# Patient Record
Sex: MALE | Race: BLACK OR AFRICAN AMERICAN
[De-identification: names, ages, dates, MRNs, and addresses within clinical notes are randomized per-mention and may not be internally consistent; named-entity substitution may affect disease eponyms.]

---

## 2018-09-22 ENCOUNTER — HOSPITAL ENCOUNTER (EMERGENCY)
Dept: HOSPITAL 56 - MW.ED | Age: 33
Discharge: HOME | End: 2018-09-22
Payer: COMMERCIAL

## 2018-09-22 DIAGNOSIS — L20.9: Primary | ICD-10-CM

## 2018-09-22 DIAGNOSIS — L23.9: ICD-10-CM

## 2018-09-22 NOTE — EDM.PDOC
ED HPI GENERAL MEDICAL PROBLEM





- General


Chief Complaint: Skin Complaint


Stated Complaint: RASH ON NECK


Time Seen by Provider: 09/22/18 13:15


Source of Information: Reports: Patient


History Limitations: Reports: No Limitations





- History of Present Illness


INITIAL COMMENTS - FREE TEXT/NARRATIVE: 


HISTORY AND PHYSICAL:





History of present illness:


Patient is a 33-year-old male who presents to the emergency room with 

complaints of a rash to the back of his neck 4 days. He states that the rash 

severity fluctuates depending on temperature. He states when he gets really hot 

or warm water, the rash does become itchy. He denies any ill contacts to new 

materials, products or medications. This rash is localized to the posterior 

aspect of his neck. Does not appear anywhere else on his body.





He denies any fever, chills, chest pain, shortness of breath or cough. He 

denies any abdominal pain or nausea, vomiting, diarrhea or constipation.





Review of systems: 


As per history of present illness and below otherwise all systems reviewed and 

negative.





Past medical history: 


As per history of present illness and as reviewed below otherwise 

noncontributory.





Surgical history: 


As per history of present illness and as reviewed below otherwise 

noncontributory.





Social history: 


No reported history of drug or alcohol abuse.





Family history: 


As per history of present illness and as reviewed below otherwise 

noncontributory.





Physical exam:


General: Well-developed and well-nourished 33-year-old -American male. 

Alert and oriented. Nontoxic appearing and in no acute distress.


HEENT: Atraumatic, normocephalic, pupils equal and reactive bilaterally, 

negative for conjunctival pallor or scleral icterus, mucous membranes moist, 

throat clear, neck supple, nontender, trachea midline. No drooling or trismus 

noted. No meningeal signs


Lungs: Clear to auscultation, breath sounds equal bilaterally, chest nontender.


Heart: S1S2, regular rate and rhythm without overt murmur


Abdomen: Soft, nondistended, nontender. Negative for masses or 

hepatosplenomegaly. Negative for costovertebral tenderness.


Pelvis: Stable nontender.


Genitourinary: Deferred.


Rectal: Deferred.


Skin: There does appear to be a slightly raised area nonspecific rash to the 

back and neck. No erythema. This is localized to the posterior neck (exposed 

area above the t-shirt and below his hairline). NO cerntal umbilicus or 

vesicular component to this rash is noted. Otherwise skin is intact, warm, dry. 

No lesions noted.


Extremities: Atraumatic, negative for cords or calf pain. Neurovascular 

unremarkable.


Neuro: Awake, alert, oriented. Cranial nerves II through XII unremarkable. 

Cerebellum unremarkable. Motor and sensory unremarkable throughout. Exam 

nonfocal.





Diagnostics:


None





Therapeutics:


None





Prescription:


Medrol DosePak





Impression: 


Atopic Dermatitis





Plan:


1. Take oral steroid as directed.


2. Take over-the-counter Benadryl and cortisone cream as directed


3. Avoid hot showers this is caused increased itching. After showering please 

apply a thick emoliant cream like Eucerine


4. Follow up with primary care provider. Return to the ED as needed and as 

discussed.





Definitive disposition and diagnosis as appropriate pending reevaluation and 

review of above.








- Related Data


 Allergies











Allergy/AdvReac Type Severity Reaction Status Date / Time


 


No Known Allergies Allergy   Verified 09/22/18 12:51











Home Meds: 


 Home Meds





. [No Known Home Meds]  10/31/14 [History]











Past Medical History





- Past Health History


Medical/Surgical History: Denies Medical/Surgical History





Social & Family History





- Family History


Family Medical History: Noncontributory





- Tobacco Use


Smoking Status *Q: Never Smoker





- Caffeine Use


Caffeine Use: Reports: None





- Recreational Drug Use


Recreational Drug Use: No





ED ROS GENERAL





- Review of Systems


Review Of Systems: ROS reveals no pertinent complaints other than HPI.





ED EXAM, SKIN/RASH


Exam: See Below (See dictation)





Course





- Vital Signs


Last Recorded V/S: 


 Last Vital Signs











Temp  98.7 F   09/22/18 12:49


 


Pulse  61   09/22/18 12:49


 


Resp  16   09/22/18 12:49


 


BP  117/78   09/22/18 12:49


 


Pulse Ox  97   09/22/18 12:49














Departure





- Departure


Time of Disposition: 13:26


Disposition: Home, Self-Care 01


Clinical Impression: 


 Atopic dermatitis, mild








- Discharge Information


Instructions:  Atopic Dermatitis


Referrals: 


PCP,None [Primary Care Provider] - 


Forms:  ED Department Discharge


Additional Instructions: 


The following information is given to patients seen in the emergency department 

who are being discharged to home. This information is to outline your options 

for follow-up care. We provide all patients seen in our emergency department 

with a follow-up referral.





The need for follow-up, as well as the timing and circumstances, are variable 

depending upon the specifics of your emergency department visit.





If you don't have a primary care physician on staff, we will provide you with a 

referral. We always advise you to contact your personal physician following an 

emergency department visit to inform them of the circumstance of the visit and 

for follow-up with them and/or the need for any referrals to a consulting 

specialist.





The emergency department will also refer you to a specialist when appropriate. 

This referral assures that you have the opportunity for follow-up care with a 

specialist. All of these measure are taken in an effort to provide you with 

optimal care, which includes your follow-up.





Under all circumstances we always encourage you to contact your private 

physician who remains a resource for coordinating your care. When calling for 

follow-up care, please make the office aware that this follow-up is from your 

recent emergency room visit. If for any reason you are refused follow-up, 

please contact the [facility] Emergency 

Department at [phone] and asked to speak to the emergency department 

charge nurse.





[facility]


Primary Care


[address] [place] [id number]


Phone: [phone]


Fax: [phone]





1. Take oral steroid as directed.


2. Take over-the-counter Benadryl and cortisone cream as directed


3. Avoid hot showers this is caused increased itching. After showering please 

apply a thick emoliant cream like Eucerine


4. Follow up with primary care provider. Return to the ED as needed and as 

discussed.

## 2018-09-29 ENCOUNTER — HOSPITAL ENCOUNTER (EMERGENCY)
Dept: HOSPITAL 56 - MW.ED | Age: 33
LOS: 1 days | Discharge: HOME | End: 2018-09-30
Payer: COMMERCIAL

## 2018-09-29 DIAGNOSIS — K21.9: Primary | ICD-10-CM

## 2018-09-29 LAB
CHLORIDE SERPL-SCNC: 102 MMOL/L (ref 98–107)
SODIUM SERPL-SCNC: 138 MMOL/L (ref 136–148)

## 2018-09-29 PROCEDURE — 99284 EMERGENCY DEPT VISIT MOD MDM: CPT

## 2018-09-29 PROCEDURE — 85025 COMPLETE CBC W/AUTO DIFF WBC: CPT

## 2018-09-29 PROCEDURE — 86677 HELICOBACTER PYLORI ANTIBODY: CPT

## 2018-09-29 PROCEDURE — 80053 COMPREHEN METABOLIC PANEL: CPT

## 2018-09-29 PROCEDURE — 96374 THER/PROPH/DIAG INJ IV PUSH: CPT

## 2018-09-29 NOTE — EDM.PDOC
ED HPI GENERAL MEDICAL PROBLEM





- General


Chief Complaint: Abdominal Pain


Stated Complaint: STOMACH PAIN


Time Seen by Provider: 09/29/18 23:01


Source of Information: Reports: Patient


History Limitations: Reports: No Limitations





- History of Present Illness


INITIAL COMMENTS - FREE TEXT/NARRATIVE: 





HISTORY AND PHYSICAL:





History of present illness:


33-year-old male presenting to emergency department with chief complaint of 

heartburn/stomach pain starting this evening.





Patient states that tonight when he was drinking water he began to have some 

heartburn and stomach pain. He's had this before but not as severe. Pain was 

intermittent and burning. Denies any overt chest pain or history of 

cardiopulmonary disease. Denies any recent fever, chills, nausea, vomiting, or 

diarrhea. Otherwise he is generally healthy and has no significant past medical 

history.





No pain on exam.





Review of systems: 


As per history of present illness and below otherwise all systems reviewed and 

negative.





Past medical history: 


As per history of present illness and as reviewed below otherwise 

noncontributory.





Surgical history: 


As per history of present illness and as reviewed below otherwise 

noncontributory.





Social history: 


No reported history of drug or alcohol abuse.





Family history: 


As per history of present illness and as reviewed below otherwise 

noncontributory.





Physical exam:


HEENT: Atraumatic, normocephalic, pupils reactive, negative for conjunctival 

pallor or scleral icterus, mucous membranes moist, throat clear, neck supple, 

nontender, trachea midline.


Lungs: Clear to auscultation, breath sounds equal bilaterally, chest nontender.


Heart: S1S2, regular, negative for clicks, rubs, or JVD.


Abdomen: Soft, nondistended, nontender. Negative for masses or 

hepatosplenomegaly. Negative for costovertebral tenderness.


Pelvis: Stable nontender.


Genitourinary: Deferred.


Rectal: Deferred.


Extremities: Atraumatic, negative for cords or calf pain. Neurovascular 

unremarkable.


Neuro: Awake, alert, oriented. Cranial nerves II through XII unremarkable. 

Cerebellum unremarkable. Motor and sensory unremarkable throughout. Exam 

nonfocal.





Diagnostics:


CBC, CMP, H. pylori





Therapeutics:


Famotidine 20 mg IV 1, GI cocktail 1, omeprazole 40 mg by mouth daily 14 days

, Carafate 1 g by mouth 4 times a day 7 days





Impression: 


GERD


Stomach pain.





Plan:


CBC, CMP, and H. pylori were all negative. Patient improved before even being 

in the emergency department however I did treat him with famotidine as well as 

a GI cocktail. He felt significantly better. I did give him a prescription for 

omeprazole and Carafate. I also instructed him follow-up with a primary care 

provider and gave him information to contact one on Monday. Patient may benefit 

from a EGD in the near future if he continues to have heartburn. I did discuss 

this with him. He is to return to emergency department if he has any new or 

worsening symptoms.








Definitive disposition and diagnosis as appropriate pending reevaluation and 

review of above.








  ** abdominal pain


Pain Score (Numeric/FACES): 3





- Related Data


 Allergies











Allergy/AdvReac Type Severity Reaction Status Date / Time


 


No Known Allergies Allergy   Verified 09/29/18 20:13











Home Meds: 


 Home Meds





. [No Known Home Meds]  10/31/14 [History]











Past Medical History





- Past Health History


Medical/Surgical History: Denies Medical/Surgical History





- Infectious Disease History


Infectious Disease History: Reports: None





Social & Family History





- Family History


Family Medical History: Noncontributory





- Tobacco Use


Smoking Status *Q: Never Smoker





- Caffeine Use


Caffeine Use: Reports: None





- Recreational Drug Use


Recreational Drug Use: No





ED ROS GENERAL





- Review of Systems


Review Of Systems: ROS reveals no pertinent complaints other than HPI.





ED EXAM, GENERAL





- Physical Exam


Exam: See Below





Course





- Vital Signs


Last Recorded V/S: 


 Last Vital Signs











Temp  98.3 F   09/29/18 20:13


 


Pulse  77   09/29/18 20:13


 


Resp  20   09/29/18 20:13


 


BP  131/81   09/29/18 20:13


 


Pulse Ox  99   09/29/18 20:13














- Orders/Labs/Meds


Labs: 


 Laboratory Tests











  09/29/18 09/29/18 09/29/18 Range/Units





  23:15 23:15 23:15 


 


WBC  7.45    (4.0-11.0)  K/uL


 


RBC  6.31 H    (4.50-5.90)  M/uL


 


Hgb  15.4    (13.0-17.0)  g/dL


 


Hct  44.9    (38.0-50.0)  %


 


MCV  71.2 L    (80.0-98.0)  fL


 


MCH  24.4 L    (27.0-32.0)  pg


 


MCHC  34.3    (31.0-37.0)  g/dL


 


RDW Std Deviation  41.3    (28.0-62.0)  fl


 


RDW Coeff of Blaze  16 H    (11.0-15.0)  %


 


Plt Count  175    (150-400)  K/uL


 


MPV  9.70    (7.40-12.00)  fL


 


Neut % (Auto)  43.7 L    (48.0-80.0)  %


 


Lymph % (Auto)  46.4 H    (16.0-40.0)  %


 


Mono % (Auto)  8.5    (0.0-15.0)  %


 


Eos % (Auto)  1.1    (0.0-7.0)  %


 


Baso % (Auto)  0.3    (0.0-1.5)  %


 


Neut # (Auto)  3.3    (1.4-5.7)  K/uL


 


Lymph # (Auto)  3.5 H    (0.6-2.4)  K/uL


 


Mono # (Auto)  0.6    (0.0-0.8)  K/uL


 


Eos # (Auto)  0.1    (0.0-0.7)  K/uL


 


Baso # (Auto)  0.0    (0.0-0.1)  K/uL


 


Nucleated RBC %  0.0    /100WBC


 


Nucleated RBCs #  0    K/uL


 


Sodium   138   (136-148)  mmol/L


 


Potassium   4.6   (3.5-5.1)  mmol/L


 


Chloride   102   ()  mmol/L


 


Carbon Dioxide   30.9   (21.0-32.0)  mmol/L


 


BUN   35 H   (7.0-18.0)  mg/dL


 


Creatinine   1.3   (0.8-1.3)  mg/dL


 


Est Cr Clr Drug Dosing   79.23   mL/min


 


Estimated GFR (MDRD)   > 60.0   ml/min


 


Glucose   100   ()  mg/dL


 


Calcium   9.8   (8.5-10.1)  mg/dL


 


Total Bilirubin   0.7   (0.2-1.0)  mg/dL


 


AST   36   (15-37)  IU/L


 


ALT   63   (14-63)  IU/L


 


Alkaline Phosphatase   52   ()  U/L


 


Total Protein   8.1   (6.4-8.2)  g/dL


 


Albumin   4.0   (3.4-5.0)  g/dL


 


Globulin   4.1 H   (2.0-3.5)  g/dL


 


Albumin/Globulin Ratio   1.0 L   (1.3-2.8)  


 


H. pylori IgG Antibody    NEGATIVE  (NEG)  











Meds: 


Medications














Discontinued Medications














Generic Name Dose Route Start Last Admin





  Trade Name Freq  PRN Reason Stop Dose Admin


 


Famotidine  20 mg  09/29/18 23:31  09/29/18 23:39





  Pepcid  IVPUSH  09/29/18 23:32  20 mg





  ONETIME ONE   Administration





     





     





     





     














Departure





- Departure


Time of Disposition: 00:40


Disposition: Home, Self-Care 01


Condition: Good


Clinical Impression: 


 Stomach pain





GERD (gastroesophageal reflux disease)


Qualifiers:


 Esophagitis presence: esophagitis presence not specified Qualified Code(s): 

K21.9 - Gastro-esophageal reflux disease without esophagitis








- Discharge Information


Referrals: 


PCP,None [Primary Care Provider] - 


Forms:  ED Department Discharge


Additional Instructions: 


My general discharge


The following information is given to patients seen in the emergency department 

who are being discharged to home. This information is to outline your options 

for follow-up care. We provide all patients seen in our emergency department 

with a follow-up referral.





The need for follow-up, as well as the timing and circumstances, are variable 

depending upon the specifics of your emergency department visit.





If you don't have a primary care physician on staff, we will provide you with a 

referral. We always advise you to contact your personal physician following an 

emergency department visit to inform them of the circumstance of the visit and 

for follow-up with them and/or the need for any referrals to a consulting 

specialist.





The emergency department will also refer you to a specialist when appropriate. 

This referral assures that you have the opportunity for follow-up care with a 

specialist. All of these measure are taken in an effort to provide you with 

optimal care, which includes your follow-up.





Under all circumstances we always encourage you to contact your private 

physician who remains a resource for coordinating your care. When calling for 

follow-up care, please make the office aware that this follow-up is from your 

recent emergency room visit. If for any reason you are refused follow-up, 

please contact the [facility] Emergency 

Department at [phone] and asked to speak to the emergency department 

charge nurse.





[facility]


Primary Care


[address] [place] [address]


Phone: [phone]


Fax: [phone]





Please call above number to follow-up with a primary care provider. Be sure to 

tell them that you were seen in the emergency department and they will need to 

be seen as soon as possible.


Take medication as prescribed.


Return to emergency department if any new or worsening symptoms.

## 2018-09-30 ENCOUNTER — HOSPITAL ENCOUNTER (EMERGENCY)
Dept: HOSPITAL 56 - MW.ED | Age: 33
Discharge: HOME | End: 2018-09-30
Payer: COMMERCIAL

## 2018-09-30 DIAGNOSIS — Z13.9: Primary | ICD-10-CM
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
## 2018-10-05 ENCOUNTER — HOSPITAL ENCOUNTER (EMERGENCY)
Dept: HOSPITAL 56 - MW.ED | Age: 33
Discharge: HOME | End: 2018-10-05
Payer: COMMERCIAL

## 2018-10-05 DIAGNOSIS — F41.9: ICD-10-CM

## 2018-10-05 DIAGNOSIS — R20.8: Primary | ICD-10-CM
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
